# Patient Record
Sex: MALE | Race: WHITE | NOT HISPANIC OR LATINO
[De-identification: names, ages, dates, MRNs, and addresses within clinical notes are randomized per-mention and may not be internally consistent; named-entity substitution may affect disease eponyms.]

---

## 2019-08-05 PROBLEM — Z00.00 ENCOUNTER FOR PREVENTIVE HEALTH EXAMINATION: Status: ACTIVE | Noted: 2019-08-05

## 2019-08-14 ENCOUNTER — APPOINTMENT (OUTPATIENT)
Dept: OTOLARYNGOLOGY | Facility: CLINIC | Age: 44
End: 2019-08-14
Payer: COMMERCIAL

## 2019-08-14 VITALS
BODY MASS INDEX: 28.25 KG/M2 | WEIGHT: 180 LBS | HEIGHT: 67 IN | SYSTOLIC BLOOD PRESSURE: 145 MMHG | DIASTOLIC BLOOD PRESSURE: 87 MMHG | HEART RATE: 91 BPM

## 2019-08-14 DIAGNOSIS — F17.200 NICOTINE DEPENDENCE, UNSPECIFIED, UNCOMPLICATED: ICD-10-CM

## 2019-08-14 DIAGNOSIS — H60.542 ACUTE ECZEMATOID OTITIS EXTERNA, LEFT EAR: ICD-10-CM

## 2019-08-14 PROCEDURE — 92567 TYMPANOMETRY: CPT

## 2019-08-14 PROCEDURE — 99203 OFFICE O/P NEW LOW 30 MIN: CPT

## 2019-08-14 PROCEDURE — 92557 COMPREHENSIVE HEARING TEST: CPT

## 2019-08-14 NOTE — CONSULT LETTER
[Dear  ___] : Dear  [unfilled], [Consult Letter:] : I had the pleasure of evaluating your patient, [unfilled]. [Please see my note below.] : Please see my note below. [Sincerely,] : Sincerely, [FreeTextEntry3] : Kenton Macias MD\par

## 2019-08-14 NOTE — HISTORY OF PRESENT ILLNESS
[de-identified] : EMILY HERNANDES is a 44 year man with a history of several months of itchiness AS. he used ear drops which helped somewhat.\par \par He also thinks he may have hearing loss. His mother his hard of hearing. he denies tinnitus.\par He also complains of muscle spasm in his neck and head.

## 2019-08-14 NOTE — ASSESSMENT
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 probably noise induced (4K and then recovery). We discussed MRI but I will hold off and repeat audiogram in 6 months.\par I will prescribe dermotic oil ear drops for the dermatitis.\par \par I am referring him to Dr. Lucia Camejo (Physical Medicine) for evaluation and probable PT. RTC 6 months.

## 2019-08-14 NOTE — REVIEW OF SYSTEMS
[Ear Itch] : ear itch [Hearing Loss] : hearing loss [Throat Dryness] : throat dryness [Patient Intake Form Reviewed] : Patient intake form was reviewed

## 2019-08-14 NOTE — HISTORY OF PRESENT ILLNESS
[de-identified] : EMILY HERNANDES is a 44 year man with a history of several months of itchiness AS. he used ear drops which helped somewhat.\par \par He also thinks he may have hearing loss. His mother his hard of hearing. he denies tinnitus.\par He also complains of muscle spasm in his neck and head.

## 2019-10-16 ENCOUNTER — APPOINTMENT (OUTPATIENT)
Dept: OTOLARYNGOLOGY | Facility: CLINIC | Age: 44
End: 2019-10-16
Payer: COMMERCIAL

## 2019-10-16 VITALS
WEIGHT: 180 LBS | HEART RATE: 97 BPM | SYSTOLIC BLOOD PRESSURE: 125 MMHG | BODY MASS INDEX: 28.25 KG/M2 | HEIGHT: 67 IN | DIASTOLIC BLOOD PRESSURE: 85 MMHG

## 2019-10-16 DIAGNOSIS — H91.13 PRESBYCUSIS, BILATERAL: ICD-10-CM

## 2019-10-16 DIAGNOSIS — H65.01 ACUTE SEROUS OTITIS MEDIA, RIGHT EAR: ICD-10-CM

## 2019-10-16 PROCEDURE — 31231 NASAL ENDOSCOPY DX: CPT

## 2019-10-16 PROCEDURE — 99213 OFFICE O/P EST LOW 20 MIN: CPT | Mod: 25

## 2019-10-16 RX ORDER — FLUOCINOLONE ACETONIDE 0.11 MG/ML
0.01 OIL AURICULAR (OTIC) DAILY
Qty: 1 | Refills: 1 | Status: DISCONTINUED | COMMUNITY
Start: 2019-08-14 | End: 2019-10-16

## 2019-10-16 NOTE — HISTORY OF PRESENT ILLNESS
[de-identified] : EMILY HERNANDES is a 44 year man with a history of recent flu who complains of decreased hearing AD with tinnitus. His throat is congested and he has right sided facial pain. Taste and smell are down.

## 2019-10-16 NOTE — ASSESSMENT
[FreeTextEntry1] : EMILY HERNANDES has right MILAGRO and right sided sinus infection. I will start him on Augmentin 875 mg and I will follow the culture.

## 2019-10-22 LAB — BACTERIA FLD CULT: ABNORMAL

## 2019-11-01 ENCOUNTER — APPOINTMENT (OUTPATIENT)
Dept: OTOLARYNGOLOGY | Facility: CLINIC | Age: 44
End: 2019-11-01

## 2019-11-13 ENCOUNTER — APPOINTMENT (OUTPATIENT)
Dept: OTOLARYNGOLOGY | Facility: CLINIC | Age: 44
End: 2019-11-13
Payer: COMMERCIAL

## 2019-11-13 VITALS
BODY MASS INDEX: 28.25 KG/M2 | DIASTOLIC BLOOD PRESSURE: 85 MMHG | HEIGHT: 67 IN | WEIGHT: 180 LBS | SYSTOLIC BLOOD PRESSURE: 117 MMHG | HEART RATE: 110 BPM

## 2019-11-13 DIAGNOSIS — J01.00 ACUTE MAXILLARY SINUSITIS, UNSPECIFIED: ICD-10-CM

## 2019-11-13 DIAGNOSIS — H68.009: ICD-10-CM

## 2019-11-13 PROCEDURE — 99213 OFFICE O/P EST LOW 20 MIN: CPT | Mod: 25

## 2019-11-13 PROCEDURE — 31231 NASAL ENDOSCOPY DX: CPT

## 2019-11-13 RX ORDER — AMOXICILLIN AND CLAVULANATE POTASSIUM 875; 125 MG/1; MG/1
875-125 TABLET, COATED ORAL TWICE DAILY
Qty: 20 | Refills: 0 | Status: DISCONTINUED | COMMUNITY
Start: 2019-10-16 | End: 2019-11-13

## 2019-11-13 NOTE — HISTORY OF PRESENT ILLNESS
[de-identified] : EMILY HERNANDES is a 44 year man with a history of recent sinus infection (h. flu) and right MILAGRO. He used Augmentin 875mg and feels much better but not 100%. His ear is mildly congested.

## 2019-11-13 NOTE — REVIEW OF SYSTEMS
[Hearing Loss] : hearing loss [Nasal Congestion] : nasal congestion [Sinus Pressure] : sinus pressure [Patient Intake Form Reviewed] : Patient intake form was reviewed

## 2020-02-12 ENCOUNTER — APPOINTMENT (OUTPATIENT)
Dept: OTOLARYNGOLOGY | Facility: CLINIC | Age: 45
End: 2020-02-12

## 2023-05-10 ENCOUNTER — APPOINTMENT (OUTPATIENT)
Dept: OTOLARYNGOLOGY | Facility: CLINIC | Age: 48
End: 2023-05-10
Payer: COMMERCIAL

## 2023-05-10 VITALS — BODY MASS INDEX: 27.28 KG/M2 | WEIGHT: 180 LBS | HEIGHT: 68 IN

## 2023-05-10 DIAGNOSIS — H93.12 TINNITUS, LEFT EAR: ICD-10-CM

## 2023-05-10 DIAGNOSIS — H90.42 SENSORINEURAL HEARING LOSS, UNILATERAL, LEFT EAR, WITH UNRESTRICTED HEARING ON THE CONTRALATERAL SIDE: ICD-10-CM

## 2023-05-10 DIAGNOSIS — H90.41 SENSORINEURAL HEARING LOSS, UNILATERAL, RIGHT EAR, WITH UNRESTRICTED HEARING ON THE CONTRALATERAL SIDE: ICD-10-CM

## 2023-05-10 PROCEDURE — 99214 OFFICE O/P EST MOD 30 MIN: CPT

## 2023-05-10 PROCEDURE — 92557 COMPREHENSIVE HEARING TEST: CPT

## 2023-05-10 PROCEDURE — 92567 TYMPANOMETRY: CPT

## 2023-05-10 RX ORDER — MOMETASONE FUROATE 1 MG/G
0.1 CREAM TOPICAL
Qty: 1 | Refills: 0 | Status: COMPLETED | COMMUNITY
Start: 2019-10-16 | End: 2023-05-10

## 2023-05-10 NOTE — ASSESSMENT
[FreeTextEntry1] : EMILY HERNANDES has new left sided tinnitus with stable hearing loss. He needs MRI. He will send me report of recent MRI he [facility] at .

## 2023-05-10 NOTE — HISTORY OF PRESENT ILLNESS
[de-identified] : EMILY HERNANDES is a 47 year man with a history of new tinnitus AS maybe AD. He has asymmetrical HFSNHL.

## 2023-05-17 PROBLEM — H90.41 SENSORINEURAL HEARING LOSS (SNHL) OF RIGHT EAR WITH UNRESTRICTED HEARING OF LEFT EAR: Status: ACTIVE | Noted: 2023-05-17

## 2025-08-06 ENCOUNTER — NON-APPOINTMENT (OUTPATIENT)
Age: 50
End: 2025-08-06

## 2025-08-07 ENCOUNTER — APPOINTMENT (OUTPATIENT)
Dept: PHYSICAL MEDICINE AND REHAB | Facility: CLINIC | Age: 50
End: 2025-08-07
Payer: COMMERCIAL

## 2025-08-07 VITALS
SYSTOLIC BLOOD PRESSURE: 123 MMHG | HEIGHT: 68 IN | DIASTOLIC BLOOD PRESSURE: 71 MMHG | HEART RATE: 89 BPM | BODY MASS INDEX: 27.28 KG/M2 | OXYGEN SATURATION: 98 % | RESPIRATION RATE: 16 BRPM | WEIGHT: 180 LBS

## 2025-08-07 DIAGNOSIS — R29.898 OTHER SYMPTOMS AND SIGNS INVOLVING THE MUSCULOSKELETAL SYSTEM: ICD-10-CM

## 2025-08-07 DIAGNOSIS — M54.9 DORSALGIA, UNSPECIFIED: ICD-10-CM

## 2025-08-07 DIAGNOSIS — G89.29 PAIN IN RIGHT HIP: ICD-10-CM

## 2025-08-07 DIAGNOSIS — M54.50 LOW BACK PAIN, UNSPECIFIED: ICD-10-CM

## 2025-08-07 DIAGNOSIS — Z82.62 FAMILY HISTORY OF OSTEOPOROSIS: ICD-10-CM

## 2025-08-07 DIAGNOSIS — M47.819 SPONDYLOSIS W/OUT MYELOPATHY OR RADICULOPATHY, SITE UNSPECIFIED: ICD-10-CM

## 2025-08-07 DIAGNOSIS — M25.69 STIFFNESS OF OTHER SPECIFIED JOINT, NOT ELSEWHERE CLASSIFIED: ICD-10-CM

## 2025-08-07 DIAGNOSIS — M25.551 PAIN IN RIGHT HIP: ICD-10-CM

## 2025-08-07 DIAGNOSIS — M79.2 NEURALGIA AND NEURITIS, UNSPECIFIED: ICD-10-CM

## 2025-08-07 DIAGNOSIS — M54.2 CERVICALGIA: ICD-10-CM

## 2025-08-07 DIAGNOSIS — M79.18 MYALGIA, OTHER SITE: ICD-10-CM

## 2025-08-07 DIAGNOSIS — G89.29 LOW BACK PAIN, UNSPECIFIED: ICD-10-CM

## 2025-08-07 PROCEDURE — 99417 PROLNG OP E/M EACH 15 MIN: CPT

## 2025-08-07 PROCEDURE — 99205 OFFICE O/P NEW HI 60 MIN: CPT

## 2025-08-08 PROBLEM — Z82.62 FAMILY HISTORY OF OSTEOPOROSIS: Status: ACTIVE | Noted: 2025-08-08
